# Patient Record
Sex: FEMALE | URBAN - NONMETROPOLITAN AREA
[De-identification: names, ages, dates, MRNs, and addresses within clinical notes are randomized per-mention and may not be internally consistent; named-entity substitution may affect disease eponyms.]

---

## 2019-11-04 ENCOUNTER — OPTICAL OFFICE (OUTPATIENT)
Dept: URBAN - NONMETROPOLITAN AREA CLINIC 4 | Facility: CLINIC | Age: 24
Setting detail: OPHTHALMOLOGY
End: 2019-11-04
Payer: COMMERCIAL

## 2019-11-04 ENCOUNTER — DOCTOR'S OFFICE (OUTPATIENT)
Dept: URBAN - NONMETROPOLITAN AREA CLINIC 1 | Facility: CLINIC | Age: 24
Setting detail: OPHTHALMOLOGY
End: 2019-11-04
Payer: COMMERCIAL

## 2019-11-04 DIAGNOSIS — H52.13: ICD-10-CM

## 2019-11-04 DIAGNOSIS — H52.223: ICD-10-CM

## 2019-11-04 PROBLEM — Z01.00 GOOD OCULAR HEALTH OU: Status: ACTIVE | Noted: 2019-11-04

## 2019-11-04 PROCEDURE — V2020 VISION SVCS FRAMES PURCHASES: HCPCS | Performed by: OPTOMETRIST

## 2019-11-04 PROCEDURE — 92004 COMPRE OPH EXAM NEW PT 1/>: CPT | Performed by: OPTOMETRIST

## 2019-11-04 PROCEDURE — V2103 SPHEROCYLINDR 4.00D/12-2.00D: HCPCS | Performed by: OPTOMETRIST

## 2019-11-04 ASSESSMENT — REFRACTION_MANIFEST
OS_AXIS: 002
OD_VA1: 20/20
OD_VA3: 20/
OS_VA1: 20/20
OD_AXIS: 180
OD_VA2: 20/20
OS_VA2: 20/
OD_VA1: 20/
OS_VA2: 20/20
OD_VA3: 20/
OS_SPHERE: -2.25
OS_VA3: 20/
OU_VA: 20/
OD_CYLINDER: -0.50
OS_CYLINDER: -2.50
OS_VA3: 20/
OS_VA1: 20/
OU_VA: 20/
OD_VA2: 20/
OD_SPHERE: -3.75

## 2019-11-04 ASSESSMENT — REFRACTION_CURRENTRX
OD_OVR_VA: 20/
OS_OVR_VA: 20/
OS_VPRISM_DIRECTION: SV
OS_OVR_VA: 20/
OS_SPHERE: -3.25
OD_OVR_VA: 20/
OD_SPHERE: -4.50
OD_AXIS: 180
OD_CYLINDER: 0.00
OS_OVR_VA: 20/
OS_AXIS: 179
OS_CYLINDER: -1.75
OD_VPRISM_DIRECTION: SV
OD_OVR_VA: 20/

## 2019-11-04 ASSESSMENT — REFRACTION_AUTOREFRACTION
OD_SPHERE: -3.50
OD_CYLINDER: -0.50
OD_AXIS: 027
OS_AXIS: 002
OS_CYLINDER: -2.50
OS_SPHERE: -2.00

## 2019-11-04 ASSESSMENT — SPHEQUIV_DERIVED
OS_SPHEQUIV: -3.5
OD_SPHEQUIV: -4
OS_SPHEQUIV: -3.25
OD_SPHEQUIV: -3.75

## 2019-11-04 ASSESSMENT — VISUAL ACUITY
OS_BCVA: 20/30-2
OD_BCVA: 20/30-2

## 2019-11-04 ASSESSMENT — CONFRONTATIONAL VISUAL FIELD TEST (CVF)
OS_FINDINGS: FULL
OD_FINDINGS: FULL

## 2024-08-23 ENCOUNTER — OFFICE VISIT (OUTPATIENT)
Dept: URGENT CARE | Facility: CLINIC | Age: 29
End: 2024-08-23
Payer: COMMERCIAL

## 2024-08-23 ENCOUNTER — APPOINTMENT (OUTPATIENT)
Dept: RADIOLOGY | Facility: CLINIC | Age: 29
End: 2024-08-23
Payer: COMMERCIAL

## 2024-08-23 VITALS
HEART RATE: 110 BPM | WEIGHT: 185 LBS | DIASTOLIC BLOOD PRESSURE: 70 MMHG | BODY MASS INDEX: 29.03 KG/M2 | TEMPERATURE: 98.4 F | RESPIRATION RATE: 18 BRPM | OXYGEN SATURATION: 98 % | HEIGHT: 67 IN | SYSTOLIC BLOOD PRESSURE: 130 MMHG

## 2024-08-23 DIAGNOSIS — F41.9 ANXIETY: Primary | ICD-10-CM

## 2024-08-23 DIAGNOSIS — R06.02 SHORTNESS OF BREATH: ICD-10-CM

## 2024-08-23 LAB
ATRIAL RATE: 79 BPM
P AXIS: 50 DEGREES
PR INTERVAL: 136 MS
QRS AXIS: 63 DEGREES
QRSD INTERVAL: 96 MS
QT INTERVAL: 406 MS
QTC INTERVAL: 465 MS
SARS-COV-2 AG UPPER RESP QL IA: NEGATIVE
T WAVE AXIS: 53 DEGREES
VALID CONTROL: NORMAL
VENTRICULAR RATE: 79 BPM

## 2024-08-23 PROCEDURE — 93005 ELECTROCARDIOGRAM TRACING: CPT

## 2024-08-23 PROCEDURE — 87811 SARS-COV-2 COVID19 W/OPTIC: CPT

## 2024-08-23 PROCEDURE — 99203 OFFICE O/P NEW LOW 30 MIN: CPT

## 2024-08-23 PROCEDURE — 71046 X-RAY EXAM CHEST 2 VIEWS: CPT

## 2024-08-23 RX ORDER — HYDROXYZINE HYDROCHLORIDE 25 MG/1
25 TABLET, FILM COATED ORAL EVERY 6 HOURS PRN
Qty: 30 TABLET | Refills: 0 | Status: SHIPPED | OUTPATIENT
Start: 2024-08-23

## 2024-08-23 RX ORDER — LEVONORGESTREL 52 MG/1
52 INTRAUTERINE DEVICE INTRAUTERINE
COMMUNITY

## 2024-08-23 NOTE — PROGRESS NOTES
[facility]        NAME: Irene Wallace is a 28 y.o. female  : 1995    MRN: [id number]  DATE: 2024  TIME: 3:51 PM    Assessment and Plan   Anxiety [F41.9]  1. Anxiety        2. Shortness of breath  XR chest pa & lateral    POCT ECG    hydrOXYzine HCL (ATARAX) 25 mg tablet    Poct Covid 19 Rapid Antigen Test        EK bpm. NSR    Preliminary xray read by myself. No acute osseous abnormality noted. No acute cardiopulmonary disease noted. Pending radiologist final read.      Rapid covid: neg     Symptoms seem consistent with intermittent panic attacks.  Due to patient not sleeping much the past week will prescribe hydroxyzine to take as needed.  Went over sedative side effects.  Advised to follow-up with PCP for continued care.  If anything worsens the meantime proceed ER.  Patient verbalized understanding.  Low suspicion for anything cardiac or respiratory related.    Patient Instructions     Take hydroxyzine as needed   Follow up with PCP in 3-5 days.  Proceed to  ER if symptoms worsen.    If tests are performed, our office will contact you with results only if changes need to made to the care plan discussed with you at the visit. You can review your full results on [facility].    Chief Complaint     Chief Complaint   Patient presents with    Shortness of Breath     SOB heart racing and feels sick when waking up in am. Pain in sternum when taking deep breaths X 3 days.          History of Present Illness       Patient with a PMHx of anxiety is presenting for intermittent shortness of breath, heart racing, and feeling sick when waking up in the morning x 3 days.  She states she has some pain at her sternum when taking deep breath x 3 days.  She stated she has not taken her Zoloft for a couple of months due to side effects but was doing well without it.  She stated she has not been sleeping well the past week.  She states that she does have nausea and had slight  vomiting but it was only phlegm.  She stated she is keeping fluids down.  She stated she is not eating well.  She stated she does have a slight cough only when she wakes up in the morning.  She stated she is trying to quit vaping.  She denies any new stressors recently.  She stated she has been waking up with a panic in the middle of the night and feels like her heart is beating out of her chest and feels hot when it is happening.  She denies any SI or HI.  She states she has a great support system at home.        Review of Systems   Review of Systems   Constitutional:  Negative for chills, diaphoresis, fatigue and fever.   HENT:  Negative for congestion, ear pain, postnasal drip, rhinorrhea, sinus pressure, sore throat and trouble swallowing.    Respiratory:  Positive for shortness of breath. Negative for cough, chest tightness and wheezing.    Cardiovascular:  Negative for chest pain and palpitations.   Skin:  Negative for color change.   Neurological:  Negative for dizziness and light-headedness.   Psychiatric/Behavioral:  Negative for sleep disturbance.          Current Medications       Current Outpatient Medications:     hydrOXYzine HCL (ATARAX) 25 mg tablet, Take 1 tablet (25 mg total) by mouth every 6 (six) hours as needed for itching, Disp: 30 tablet, Rfl: 0    Levonorgestrel (Mirena, 52 MG,) 20 MCG/DAY IUD, 52 mg by Intrauterine route, Disp: , Rfl:     Current Allergies     Allergies as of 08/23/2024    (No Known Allergies)            The following portions of the patient's history were reviewed and updated as appropriate: allergies, current medications, past family history, past medical history, past social history, past surgical history and problem list.     Past Medical History:   Diagnosis Date    Anxiety        Past Surgical History:   Procedure Laterality Date    WISDOM TOOTH EXTRACTION         Family History   Problem Relation Age of Onset    Cancer Mother     Hypertension Mother     Heart disease  "Mother          Medications have been verified.        Objective   /70   Pulse (!) 110   Temp 98.4 °F (36.9 °C)   Resp 18   Ht 5' 7\" (1.702 m)   Wt 83.9 kg (185 lb)   LMP 07/21/2024 (Approximate)   SpO2 98%   BMI 28.98 kg/m²        Physical Exam     Physical Exam  Constitutional:       General: She is not in acute distress.     Appearance: Normal appearance. She is not ill-appearing.   HENT:      Head: Normocephalic.      Right Ear: Tympanic membrane and external ear normal.      Left Ear: Tympanic membrane and external ear normal.      Nose: No congestion.      Mouth/Throat:      Mouth: Mucous membranes are moist.      Pharynx: Oropharynx is clear.   Cardiovascular:      Rate and Rhythm: Normal rate and regular rhythm.      Pulses: Normal pulses.      Heart sounds: Normal heart sounds.   Pulmonary:      Effort: Pulmonary effort is normal. No respiratory distress.      Breath sounds: Normal breath sounds. No stridor. No wheezing, rhonchi or rales.   Lymphadenopathy:      Cervical: No cervical adenopathy.   Skin:     General: Skin is warm and dry.   Neurological:      General: No focal deficit present.      Mental Status: She is alert and oriented to person, place, and time. Mental status is at baseline.   Psychiatric:         Mood and Affect: Mood normal.         Behavior: Behavior normal.         Thought Content: Thought content normal.         Judgment: Judgment normal.                   "

## 2024-08-23 NOTE — LETTER
August 23, 2024     Patient: Irene Wallace   YOB: 1995   Date of Visit: 8/23/2024       To Whom It May Concern:    It is my medical opinion that Irene Wallace may return to work on 8/26/2024 .    If you have any questions or concerns, please don't hesitate to call.         Sincerely,        Eduardo Ovalle PA-C    CC: No Recipients

## 2024-08-23 NOTE — PATIENT INSTRUCTIONS
Take hydroxyzine as needed   Follow up with PCP in 3-5 days.  Proceed to  ER if symptoms worsen.    If tests are performed, our office will contact you with results only if changes need to made to the care plan discussed with you at the visit. You can review your full results on [facility].